# Patient Record
Sex: MALE | Race: WHITE | Employment: OTHER | URBAN - METROPOLITAN AREA
[De-identification: names, ages, dates, MRNs, and addresses within clinical notes are randomized per-mention and may not be internally consistent; named-entity substitution may affect disease eponyms.]

---

## 2020-12-06 ENCOUNTER — APPOINTMENT (OUTPATIENT)
Dept: GENERAL RADIOLOGY | Age: 65
DRG: 177 | End: 2020-12-06
Payer: COMMERCIAL

## 2020-12-06 ENCOUNTER — HOSPITAL ENCOUNTER (INPATIENT)
Age: 65
LOS: 2 days | Discharge: HOME | DRG: 177 | End: 2020-12-08
Attending: EMERGENCY MEDICINE | Admitting: INTERNAL MEDICINE
Payer: COMMERCIAL

## 2020-12-06 PROBLEM — I50.9 ACUTE HEART FAILURE (HCC): Status: ACTIVE | Noted: 2020-12-06

## 2020-12-06 LAB
ALBUMIN SERPL-MCNC: 3.7 G/DL (ref 3.4–5)
ALP BLD-CCNC: 81 U/L (ref 40–129)
ALT SERPL-CCNC: 22 U/L (ref 10–40)
ANION GAP SERPL CALCULATED.3IONS-SCNC: 15 MMOL/L (ref 3–16)
AST SERPL-CCNC: 25 U/L (ref 15–37)
BASE EXCESS VENOUS: 3.8 MMOL/L (ref -2–3)
BASOPHILS ABSOLUTE: 0 K/UL (ref 0–0.2)
BASOPHILS RELATIVE PERCENT: 0.3 %
BILIRUB SERPL-MCNC: 1.1 MG/DL (ref 0–1)
BILIRUBIN DIRECT: 0.6 MG/DL (ref 0–0.3)
BILIRUBIN, INDIRECT: 0.5 MG/DL (ref 0–1)
BUN BLDV-MCNC: 13 MG/DL (ref 7–20)
CALCIUM SERPL-MCNC: 8.8 MG/DL (ref 8.3–10.6)
CARBOXYHEMOGLOBIN: 1.7 % (ref 0–1.5)
CHLORIDE BLD-SCNC: 94 MMOL/L (ref 99–110)
CO2: 25 MMOL/L (ref 21–32)
CREAT SERPL-MCNC: 0.7 MG/DL (ref 0.8–1.3)
EOSINOPHILS ABSOLUTE: 0 K/UL (ref 0–0.6)
EOSINOPHILS RELATIVE PERCENT: 0.9 %
GFR AFRICAN AMERICAN: >60
GFR NON-AFRICAN AMERICAN: >60
GLUCOSE BLD-MCNC: 134 MG/DL (ref 70–99)
GLUCOSE BLD-MCNC: 139 MG/DL (ref 70–99)
HCO3 VENOUS: 27.5 MMOL/L (ref 24–28)
HCT VFR BLD CALC: 43.3 % (ref 40.5–52.5)
HEMOGLOBIN, VEN, REDUCED: 42.2 %
HEMOGLOBIN: 14.4 G/DL (ref 13.5–17.5)
LACTIC ACID: 1.5 MMOL/L (ref 0.4–2)
LYMPHOCYTES ABSOLUTE: 0.3 K/UL (ref 1–5.1)
LYMPHOCYTES RELATIVE PERCENT: 6.6 %
MCH RBC QN AUTO: 28.9 PG (ref 26–34)
MCHC RBC AUTO-ENTMCNC: 33.2 G/DL (ref 31–36)
MCV RBC AUTO: 87.1 FL (ref 80–100)
METHEMOGLOBIN VENOUS: 0.4 % (ref 0–1.5)
MONOCYTES ABSOLUTE: 0.3 K/UL (ref 0–1.3)
MONOCYTES RELATIVE PERCENT: 6.6 %
NEUTROPHILS ABSOLUTE: 3.8 K/UL (ref 1.7–7.7)
NEUTROPHILS RELATIVE PERCENT: 85.6 %
O2 SAT, VEN: 57 %
PCO2, VEN: 39.9 MMHG (ref 41–51)
PDW BLD-RTO: 13.2 % (ref 12.4–15.4)
PERFORMED ON: ABNORMAL
PH VENOUS: 7.45 (ref 7.35–7.45)
PLATELET # BLD: 212 K/UL (ref 135–450)
PMV BLD AUTO: 8.6 FL (ref 5–10.5)
PO2, VEN: 32.8 MMHG (ref 25–40)
POTASSIUM REFLEX MAGNESIUM: 4.1 MMOL/L (ref 3.5–5.1)
PRO-BNP: 1612 PG/ML (ref 0–124)
RBC # BLD: 4.97 M/UL (ref 4.2–5.9)
SODIUM BLD-SCNC: 134 MMOL/L (ref 136–145)
TCO2 CALC VENOUS: 29 MMOL/L
TOTAL PROTEIN: 6.7 G/DL (ref 6.4–8.2)
TROPONIN: <0.01 NG/ML
WBC # BLD: 4.5 K/UL (ref 4–11)

## 2020-12-06 PROCEDURE — 99285 EMERGENCY DEPT VISIT HI MDM: CPT

## 2020-12-06 PROCEDURE — 85384 FIBRINOGEN ACTIVITY: CPT

## 2020-12-06 PROCEDURE — 82803 BLOOD GASES ANY COMBINATION: CPT

## 2020-12-06 PROCEDURE — 6370000000 HC RX 637 (ALT 250 FOR IP)

## 2020-12-06 PROCEDURE — 96361 HYDRATE IV INFUSION ADD-ON: CPT

## 2020-12-06 PROCEDURE — 84484 ASSAY OF TROPONIN QUANT: CPT

## 2020-12-06 PROCEDURE — 85379 FIBRIN DEGRADATION QUANT: CPT

## 2020-12-06 PROCEDURE — 36415 COLL VENOUS BLD VENIPUNCTURE: CPT

## 2020-12-06 PROCEDURE — 6360000002 HC RX W HCPCS

## 2020-12-06 PROCEDURE — 2580000003 HC RX 258

## 2020-12-06 PROCEDURE — 83880 ASSAY OF NATRIURETIC PEPTIDE: CPT

## 2020-12-06 PROCEDURE — 80048 BASIC METABOLIC PNL TOTAL CA: CPT

## 2020-12-06 PROCEDURE — 86140 C-REACTIVE PROTEIN: CPT

## 2020-12-06 PROCEDURE — 71045 X-RAY EXAM CHEST 1 VIEW: CPT

## 2020-12-06 PROCEDURE — 6370000000 HC RX 637 (ALT 250 FOR IP): Performed by: PHYSICIAN ASSISTANT

## 2020-12-06 PROCEDURE — 6360000002 HC RX W HCPCS: Performed by: PHYSICIAN ASSISTANT

## 2020-12-06 PROCEDURE — 85025 COMPLETE CBC W/AUTO DIFF WBC: CPT

## 2020-12-06 PROCEDURE — 82728 ASSAY OF FERRITIN: CPT

## 2020-12-06 PROCEDURE — 80076 HEPATIC FUNCTION PANEL: CPT

## 2020-12-06 PROCEDURE — 2060000000 HC ICU INTERMEDIATE R&B

## 2020-12-06 PROCEDURE — 93005 ELECTROCARDIOGRAM TRACING: CPT | Performed by: PHYSICIAN ASSISTANT

## 2020-12-06 PROCEDURE — 83605 ASSAY OF LACTIC ACID: CPT

## 2020-12-06 PROCEDURE — 2580000003 HC RX 258: Performed by: PHYSICIAN ASSISTANT

## 2020-12-06 PROCEDURE — 96374 THER/PROPH/DIAG INJ IV PUSH: CPT

## 2020-12-06 RX ORDER — VALSARTAN 160 MG/1
160 TABLET ORAL DAILY
Status: ON HOLD | COMMUNITY
End: 2020-12-08 | Stop reason: HOSPADM

## 2020-12-06 RX ORDER — DEXAMETHASONE 4 MG/1
6 TABLET ORAL DAILY
Status: DISCONTINUED | OUTPATIENT
Start: 2020-12-06 | End: 2020-12-08 | Stop reason: HOSPADM

## 2020-12-06 RX ORDER — AMLODIPINE BESYLATE 5 MG/1
5 TABLET ORAL DAILY
COMMUNITY
Start: 2020-09-15

## 2020-12-06 RX ORDER — DEXAMETHASONE 4 MG/1
6 TABLET ORAL DAILY
Status: DISCONTINUED | OUTPATIENT
Start: 2020-12-06 | End: 2020-12-06

## 2020-12-06 RX ORDER — INSULIN LISPRO 100 [IU]/ML
0-6 INJECTION, SOLUTION INTRAVENOUS; SUBCUTANEOUS
Status: DISCONTINUED | OUTPATIENT
Start: 2020-12-07 | End: 2020-12-07

## 2020-12-06 RX ORDER — ACETAMINOPHEN 500 MG
1000 TABLET ORAL ONCE
Status: COMPLETED | OUTPATIENT
Start: 2020-12-06 | End: 2020-12-06

## 2020-12-06 RX ORDER — SODIUM CHLORIDE 0.9 % (FLUSH) 0.9 %
10 SYRINGE (ML) INJECTION PRN
Status: DISCONTINUED | OUTPATIENT
Start: 2020-12-06 | End: 2020-12-08 | Stop reason: HOSPADM

## 2020-12-06 RX ORDER — ASPIRIN 81 MG/1
81 TABLET ORAL DAILY
Status: ON HOLD | COMMUNITY
End: 2020-12-07

## 2020-12-06 RX ORDER — SIMVASTATIN 20 MG
TABLET ORAL
COMMUNITY
Start: 2020-09-15

## 2020-12-06 RX ORDER — HYDROCHLOROTHIAZIDE 25 MG/1
25 TABLET ORAL DAILY
Status: DISCONTINUED | OUTPATIENT
Start: 2020-12-07 | End: 2020-12-07

## 2020-12-06 RX ORDER — POLYETHYLENE GLYCOL 3350 17 G/17G
17 POWDER, FOR SOLUTION ORAL DAILY PRN
Status: DISCONTINUED | OUTPATIENT
Start: 2020-12-06 | End: 2020-12-08 | Stop reason: HOSPADM

## 2020-12-06 RX ORDER — PROMETHAZINE HYDROCHLORIDE 25 MG/1
12.5 TABLET ORAL EVERY 6 HOURS PRN
Status: DISCONTINUED | OUTPATIENT
Start: 2020-12-06 | End: 2020-12-08 | Stop reason: HOSPADM

## 2020-12-06 RX ORDER — CARVEDILOL 12.5 MG/1
12.5 TABLET ORAL 2 TIMES DAILY WITH MEALS
COMMUNITY

## 2020-12-06 RX ORDER — ATORVASTATIN CALCIUM 10 MG/1
10 TABLET, FILM COATED ORAL DAILY
Status: DISCONTINUED | OUTPATIENT
Start: 2020-12-07 | End: 2020-12-08 | Stop reason: HOSPADM

## 2020-12-06 RX ORDER — SODIUM CHLORIDE 0.9 % (FLUSH) 0.9 %
10 SYRINGE (ML) INJECTION EVERY 12 HOURS SCHEDULED
Status: DISCONTINUED | OUTPATIENT
Start: 2020-12-06 | End: 2020-12-08 | Stop reason: HOSPADM

## 2020-12-06 RX ORDER — ONDANSETRON 2 MG/ML
4 INJECTION INTRAMUSCULAR; INTRAVENOUS EVERY 6 HOURS PRN
Status: DISCONTINUED | OUTPATIENT
Start: 2020-12-06 | End: 2020-12-06

## 2020-12-06 RX ORDER — SITAGLIPTIN AND METFORMIN HYDROCHLORIDE 1000; 50 MG/1; MG/1
1 TABLET, FILM COATED ORAL 2 TIMES DAILY
COMMUNITY
Start: 2020-09-15

## 2020-12-06 RX ORDER — AMLODIPINE BESYLATE 5 MG/1
5 TABLET ORAL NIGHTLY
Status: DISCONTINUED | OUTPATIENT
Start: 2020-12-06 | End: 2020-12-07

## 2020-12-06 RX ORDER — DEXTROSE MONOHYDRATE 25 G/50ML
12.5 INJECTION, SOLUTION INTRAVENOUS PRN
Status: DISCONTINUED | OUTPATIENT
Start: 2020-12-06 | End: 2020-12-08 | Stop reason: HOSPADM

## 2020-12-06 RX ORDER — SODIUM CHLORIDE, SODIUM LACTATE, POTASSIUM CHLORIDE, CALCIUM CHLORIDE 600; 310; 30; 20 MG/100ML; MG/100ML; MG/100ML; MG/100ML
1000 INJECTION, SOLUTION INTRAVENOUS ONCE
Status: COMPLETED | OUTPATIENT
Start: 2020-12-06 | End: 2020-12-06

## 2020-12-06 RX ORDER — NICOTINE POLACRILEX 4 MG
15 LOZENGE BUCCAL PRN
Status: DISCONTINUED | OUTPATIENT
Start: 2020-12-06 | End: 2020-12-08 | Stop reason: HOSPADM

## 2020-12-06 RX ORDER — ONDANSETRON 2 MG/ML
4 INJECTION INTRAMUSCULAR; INTRAVENOUS EVERY 6 HOURS PRN
Status: DISCONTINUED | OUTPATIENT
Start: 2020-12-06 | End: 2020-12-08 | Stop reason: HOSPADM

## 2020-12-06 RX ORDER — LISINOPRIL 20 MG/1
20 TABLET ORAL DAILY
Status: DISCONTINUED | OUTPATIENT
Start: 2020-12-07 | End: 2020-12-08 | Stop reason: HOSPADM

## 2020-12-06 RX ORDER — INSULIN LISPRO 100 [IU]/ML
0-3 INJECTION, SOLUTION INTRAVENOUS; SUBCUTANEOUS NIGHTLY
Status: DISCONTINUED | OUTPATIENT
Start: 2020-12-06 | End: 2020-12-07

## 2020-12-06 RX ORDER — DEXTROSE MONOHYDRATE 50 MG/ML
100 INJECTION, SOLUTION INTRAVENOUS PRN
Status: DISCONTINUED | OUTPATIENT
Start: 2020-12-06 | End: 2020-12-08 | Stop reason: HOSPADM

## 2020-12-06 RX ORDER — FUROSEMIDE 10 MG/ML
40 INJECTION INTRAMUSCULAR; INTRAVENOUS 2 TIMES DAILY
Status: DISCONTINUED | OUTPATIENT
Start: 2020-12-06 | End: 2020-12-08 | Stop reason: HOSPADM

## 2020-12-06 RX ORDER — ACETAMINOPHEN 650 MG/1
650 SUPPOSITORY RECTAL EVERY 6 HOURS PRN
Status: DISCONTINUED | OUTPATIENT
Start: 2020-12-06 | End: 2020-12-08 | Stop reason: HOSPADM

## 2020-12-06 RX ORDER — ACETAMINOPHEN 325 MG/1
650 TABLET ORAL EVERY 6 HOURS PRN
Status: DISCONTINUED | OUTPATIENT
Start: 2020-12-06 | End: 2020-12-08 | Stop reason: HOSPADM

## 2020-12-06 RX ORDER — SIMVASTATIN 20 MG
20 TABLET ORAL DAILY
Status: ON HOLD | COMMUNITY
Start: 2020-09-15 | End: 2020-12-07 | Stop reason: SDUPTHER

## 2020-12-06 RX ORDER — BENAZEPRIL/HYDROCHLOROTHIAZIDE 20 MG-25MG
1 TABLET ORAL DAILY
COMMUNITY
Start: 2020-09-15

## 2020-12-06 RX ORDER — SITAGLIPTIN AND METFORMIN HYDROCHLORIDE 1000; 50 MG/1; MG/1
TABLET, FILM COATED ORAL
Status: ON HOLD | COMMUNITY
Start: 2020-09-11 | End: 2020-12-07 | Stop reason: SDUPTHER

## 2020-12-06 RX ORDER — CARVEDILOL 12.5 MG/1
12.5 TABLET ORAL 2 TIMES DAILY WITH MEALS
Status: DISCONTINUED | OUTPATIENT
Start: 2020-12-07 | End: 2020-12-07

## 2020-12-06 RX ADMIN — ONDANSETRON 4 MG: 2 INJECTION INTRAMUSCULAR; INTRAVENOUS at 19:04

## 2020-12-06 RX ADMIN — AMLODIPINE BESYLATE 5 MG: 5 TABLET ORAL at 23:50

## 2020-12-06 RX ADMIN — DEXAMETHASONE 6 MG: 4 TABLET ORAL at 23:50

## 2020-12-06 RX ADMIN — FUROSEMIDE 40 MG: 10 INJECTION, SOLUTION INTRAMUSCULAR; INTRAVENOUS at 23:50

## 2020-12-06 RX ADMIN — ACETAMINOPHEN 1000 MG: 500 TABLET, COATED ORAL at 18:16

## 2020-12-06 RX ADMIN — SODIUM CHLORIDE, SODIUM LACTATE, POTASSIUM CHLORIDE, AND CALCIUM CHLORIDE 1000 ML: 600; 310; 30; 20 INJECTION, SOLUTION INTRAVENOUS at 18:17

## 2020-12-06 RX ADMIN — Medication 10 ML: at 23:51

## 2020-12-06 ASSESSMENT — ENCOUNTER SYMPTOMS
ABDOMINAL PAIN: 0
APNEA: 0
VOMITING: 0
CONSTIPATION: 0
SORE THROAT: 0
SHORTNESS OF BREATH: 1
CHEST TIGHTNESS: 0
NAUSEA: 0
COUGH: 1
DIARRHEA: 0

## 2020-12-06 ASSESSMENT — PAIN SCALES - GENERAL
PAINLEVEL_OUTOF10: 0

## 2020-12-06 NOTE — ED NOTES
Bed: B17-17  Expected date:   Expected time:   Means of arrival:   Comments:  Mariah Branch6, RN  12/06/20 [id number]

## 2020-12-07 LAB
ANION GAP SERPL CALCULATED.3IONS-SCNC: 12 MMOL/L (ref 3–16)
BASOPHILS ABSOLUTE: 0 K/UL (ref 0–0.2)
BASOPHILS RELATIVE PERCENT: 0.2 %
BUN BLDV-MCNC: 10 MG/DL (ref 7–20)
C-REACTIVE PROTEIN: 176.6 MG/L (ref 0–5.1)
CALCIUM SERPL-MCNC: 9.1 MG/DL (ref 8.3–10.6)
CHLORIDE BLD-SCNC: 94 MMOL/L (ref 99–110)
CO2: 29 MMOL/L (ref 21–32)
CREAT SERPL-MCNC: 0.8 MG/DL (ref 0.8–1.3)
D DIMER: 301 NG/ML DDU (ref 0–229)
EKG ATRIAL RATE: 107 BPM
EKG DIAGNOSIS: NORMAL
EKG Q-T INTERVAL: 372 MS
EKG QRS DURATION: 92 MS
EKG QTC CALCULATION (BAZETT): 496 MS
EKG R AXIS: 18 DEGREES
EKG T AXIS: -9 DEGREES
EKG VENTRICULAR RATE: 107 BPM
EOSINOPHILS ABSOLUTE: 0 K/UL (ref 0–0.6)
EOSINOPHILS RELATIVE PERCENT: 0.1 %
FERRITIN: 454.8 NG/ML (ref 30–400)
FIBRINOGEN: 783 MG/DL (ref 200–397)
GFR AFRICAN AMERICAN: >60
GFR NON-AFRICAN AMERICAN: >60
GLUCOSE BLD-MCNC: 201 MG/DL (ref 70–99)
GLUCOSE BLD-MCNC: 205 MG/DL (ref 70–99)
GLUCOSE BLD-MCNC: 340 MG/DL (ref 70–99)
GLUCOSE BLD-MCNC: 349 MG/DL (ref 70–99)
GLUCOSE BLD-MCNC: 350 MG/DL (ref 70–99)
HCT VFR BLD CALC: 42.8 % (ref 40.5–52.5)
HEMOGLOBIN: 14.3 G/DL (ref 13.5–17.5)
LYMPHOCYTES ABSOLUTE: 0.3 K/UL (ref 1–5.1)
LYMPHOCYTES RELATIVE PERCENT: 6.3 %
MCH RBC QN AUTO: 28.8 PG (ref 26–34)
MCHC RBC AUTO-ENTMCNC: 33.4 G/DL (ref 31–36)
MCV RBC AUTO: 86.2 FL (ref 80–100)
MONOCYTES ABSOLUTE: 0.3 K/UL (ref 0–1.3)
MONOCYTES RELATIVE PERCENT: 5.4 %
NEUTROPHILS ABSOLUTE: 4.5 K/UL (ref 1.7–7.7)
NEUTROPHILS RELATIVE PERCENT: 88 %
PDW BLD-RTO: 13.1 % (ref 12.4–15.4)
PERFORMED ON: ABNORMAL
PLATELET # BLD: 219 K/UL (ref 135–450)
PMV BLD AUTO: 8.2 FL (ref 5–10.5)
POTASSIUM REFLEX MAGNESIUM: 4.4 MMOL/L (ref 3.5–5.1)
RBC # BLD: 4.97 M/UL (ref 4.2–5.9)
SODIUM BLD-SCNC: 135 MMOL/L (ref 136–145)
WBC # BLD: 5.2 K/UL (ref 4–11)

## 2020-12-07 PROCEDURE — 6370000000 HC RX 637 (ALT 250 FOR IP)

## 2020-12-07 PROCEDURE — 6370000000 HC RX 637 (ALT 250 FOR IP): Performed by: INTERNAL MEDICINE

## 2020-12-07 PROCEDURE — 2580000003 HC RX 258

## 2020-12-07 PROCEDURE — 36415 COLL VENOUS BLD VENIPUNCTURE: CPT

## 2020-12-07 PROCEDURE — 80048 BASIC METABOLIC PNL TOTAL CA: CPT

## 2020-12-07 PROCEDURE — 6360000002 HC RX W HCPCS: Performed by: STUDENT IN AN ORGANIZED HEALTH CARE EDUCATION/TRAINING PROGRAM

## 2020-12-07 PROCEDURE — 85025 COMPLETE CBC W/AUTO DIFF WBC: CPT

## 2020-12-07 PROCEDURE — U0003 INFECTIOUS AGENT DETECTION BY NUCLEIC ACID (DNA OR RNA); SEVERE ACUTE RESPIRATORY SYNDROME CORONAVIRUS 2 (SARS-COV-2) (CORONAVIRUS DISEASE [COVID-19]), AMPLIFIED PROBE TECHNIQUE, MAKING USE OF HIGH THROUGHPUT TECHNOLOGIES AS DESCRIBED BY CMS-2020-01-R: HCPCS

## 2020-12-07 PROCEDURE — 6370000000 HC RX 637 (ALT 250 FOR IP): Performed by: STUDENT IN AN ORGANIZED HEALTH CARE EDUCATION/TRAINING PROGRAM

## 2020-12-07 PROCEDURE — 2060000000 HC ICU INTERMEDIATE R&B

## 2020-12-07 PROCEDURE — 6360000002 HC RX W HCPCS

## 2020-12-07 RX ORDER — INSULIN LISPRO 100 [IU]/ML
0-6 INJECTION, SOLUTION INTRAVENOUS; SUBCUTANEOUS NIGHTLY
Status: DISCONTINUED | OUTPATIENT
Start: 2020-12-07 | End: 2020-12-08 | Stop reason: HOSPADM

## 2020-12-07 RX ORDER — INSULIN LISPRO 100 [IU]/ML
8 INJECTION, SOLUTION INTRAVENOUS; SUBCUTANEOUS ONCE
Status: COMPLETED | OUTPATIENT
Start: 2020-12-07 | End: 2020-12-08

## 2020-12-07 RX ORDER — INSULIN LISPRO 100 [IU]/ML
0-12 INJECTION, SOLUTION INTRAVENOUS; SUBCUTANEOUS
Status: DISCONTINUED | OUTPATIENT
Start: 2020-12-07 | End: 2020-12-08 | Stop reason: HOSPADM

## 2020-12-07 RX ORDER — CARVEDILOL 3.12 MG/1
3.12 TABLET ORAL 2 TIMES DAILY WITH MEALS
Status: DISCONTINUED | OUTPATIENT
Start: 2020-12-07 | End: 2020-12-08 | Stop reason: HOSPADM

## 2020-12-07 RX ADMIN — ATORVASTATIN CALCIUM 10 MG: 10 TABLET, FILM COATED ORAL at 09:44

## 2020-12-07 RX ADMIN — FUROSEMIDE 40 MG: 10 INJECTION, SOLUTION INTRAMUSCULAR; INTRAVENOUS at 18:40

## 2020-12-07 RX ADMIN — ACETAMINOPHEN 650 MG: 325 TABLET ORAL at 02:15

## 2020-12-07 RX ADMIN — CARVEDILOL 12.5 MG: 12.5 TABLET, FILM COATED ORAL at 12:10

## 2020-12-07 RX ADMIN — FUROSEMIDE 40 MG: 10 INJECTION, SOLUTION INTRAMUSCULAR; INTRAVENOUS at 09:44

## 2020-12-07 RX ADMIN — CARVEDILOL 3.12 MG: 3.12 TABLET, FILM COATED ORAL at 18:40

## 2020-12-07 RX ADMIN — ENOXAPARIN SODIUM 40 MG: 40 INJECTION SUBCUTANEOUS at 20:54

## 2020-12-07 RX ADMIN — Medication 10 ML: at 20:59

## 2020-12-07 RX ADMIN — LISINOPRIL 20 MG: 20 TABLET ORAL at 09:44

## 2020-12-07 RX ADMIN — DEXAMETHASONE 6 MG: 4 TABLET ORAL at 09:44

## 2020-12-07 RX ADMIN — INSULIN LISPRO 2 UNITS: 100 INJECTION, SOLUTION INTRAVENOUS; SUBCUTANEOUS at 09:39

## 2020-12-07 RX ADMIN — INSULIN GLARGINE 10 UNITS: 100 INJECTION, SOLUTION SUBCUTANEOUS at 20:53

## 2020-12-07 RX ADMIN — HYDROCHLOROTHIAZIDE 25 MG: 25 TABLET ORAL at 09:44

## 2020-12-07 RX ADMIN — INSULIN LISPRO 5 UNITS: 100 INJECTION, SOLUTION INTRAVENOUS; SUBCUTANEOUS at 20:53

## 2020-12-07 RX ADMIN — INSULIN LISPRO 4 UNITS: 100 INJECTION, SOLUTION INTRAVENOUS; SUBCUTANEOUS at 13:18

## 2020-12-07 RX ADMIN — INSULIN LISPRO 8 UNITS: 100 INJECTION, SOLUTION INTRAVENOUS; SUBCUTANEOUS at 18:27

## 2020-12-07 RX ADMIN — ENOXAPARIN SODIUM 40 MG: 40 INJECTION SUBCUTANEOUS at 09:44

## 2020-12-07 RX ADMIN — Medication 10 ML: at 09:44

## 2020-12-07 NOTE — ED NOTES
Spoke to patient's daughter, Mayito Medellin, with patient's permission. Updated with plan of care. Mayito Medellin will be point- for updates.       Chantel Carlson RN  12/06/20 6916

## 2020-12-07 NOTE — ED PROVIDER NOTES
1 [facility]  EMERGENCY DEPARTMENT ENCOUNTER          PHYSICIAN ASSISTANT NOTE       *[facility] has declared a state of emergency regarding the [address] pandemic*    Date of evaluation: 12/6/2020    Chief Complaint     Shortness of Breath (COVID+)      History of Present Illness     Daysi Maharaj is a 72 y.o. male with a history of HTN presents today as a known COVID19 positive patient with several days of worsening shortness of breath, especially with lying flat. Patient states that last night he awoke from his sleep with the sense that he had stopped breathing and couldn't catch his breath. He felt progressively fatigued today which prompted him to seek treatment. He denies CP, fever, chills, sweats, history of DVT/PE but does endorse nausea without vomiting. His wife has been giving him some herbal remedies at home with no improvement. 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 Review of Systems     As documented in the HPI, otherwise all other systems were reviewed and were negative. Past Medical, Surgical, Family, and Social History     He has a past medical history of Hypertension. He has no past surgical history on file. His family history is not on file. He reports that he has never smoked. He has never used smokeless tobacco. He reports previous alcohol use. He reports previous drug use. Medications     Previous Medications    No medications on file       Allergies     He has No Known Allergies. Physical Exam     INITIAL VITALS: BP: (!) 136/102, Temp: 99.3 °F (37.4 °C), Pulse: 115, Resp: (!) 34, SpO2: 100 %     General: Well appearing, well nourished, in no apparent state of distress. HEENT:  Normocephalic, atraumatic. Pupils equal, sclera white. Handling secretions without difficulty. Neck: No meningismus. Trachea midline    Pulmonary: Respirations even. Non labored. Tachypnic.  Good air movement throughout    Cardiac: Chest symmetrical and non-tender on palpation of chest wall. RRR. no M/R/G. Abdomen:  Non-distended. Bowel sounds present in all quadrants. Non rigid and non tender to palpation. Musculoskeletal:  Ambulates under own control. No peripheral edema. Neuro:  Alert and oriented x 3. CN II - XII grossly intact. Moves all extremities spontaneously. Vascular:  2+ peripheral pulses in bilateral upper and lower extremities      Skin:  Warm and well perfused without rashes or lesions    Psych:  Appropriate mood and affect        Diagnostic Results     EKG   Interpreted in conjunction with emergency department physician Frank Fowler MD  Indication: shortness of breath  Impression: , undetermined rhytm due to severe artifact but appears    RADIOLOGY:  XR CHEST PORTABLE   Final Result      1. Patchy nonsegmental diffuse bilateral nodular hazy airspace disease most like representing Covid Pneumonia   2.  Mild cardiac enlargement                   LABS:   Results for orders placed or performed during the hospital encounter of 12/06/20   CBC Auto Differential   Result Value Ref Range    WBC 4.5 4.0 - 11.0 K/uL    RBC 4.97 4.20 - 5.90 M/uL    Hemoglobin 14.4 13.5 - 17.5 g/dL    Hematocrit 43.3 40.5 - 52.5 %    MCV 87.1 80.0 - 100.0 fL    MCH 28.9 26.0 - 34.0 pg    MCHC 33.2 31.0 - 36.0 g/dL    RDW 13.2 12.4 - 15.4 %    Platelets 187 613 - 138 K/uL    MPV 8.6 5.0 - 10.5 fL    Neutrophils % 85.6 %    Lymphocytes % 6.6 %    Monocytes % 6.6 %    Eosinophils % 0.9 %    Basophils % 0.3 %    Neutrophils Absolute 3.8 1.7 - 7.7 K/uL    Lymphocytes Absolute 0.3 (L) 1.0 - 5.1 K/uL    Monocytes Absolute 0.3 0.0 - 1.3 K/uL    Eosinophils Absolute 0.0 0.0 - 0.6 K/uL    Basophils Absolute 0.0 0.0 - 0.2 K/uL   Basic Metabolic Panel w/ Reflex to MG   Result Value Ref Range    Sodium 134 (L) 136 - 145 mmol/L    Potassium reflex Magnesium 4.1 3.5 - 5.1 mmol/L    Chloride 94 (L) 99 - 110 mmol/L    CO2 25 21 - 32 mmol/L    Anion Gap 15 3 - 16    Glucose 139 (H) 70 - 99 mg/dL    BUN 13 7 - 20 mg/dL    CREATININE 0.7 (L) 0.8 - 1.3 mg/dL    GFR Non-African American >60 >60    GFR African American >60 >60    Calcium 8.8 8.3 - 10.6 mg/dL   Hepatic Function Panel   Result Value Ref Range    Total Protein 6.7 6.4 - 8.2 g/dL    Alb 3.7 3.4 - 5.0 g/dL    Alkaline Phosphatase 81 40 - 129 U/L    ALT 22 10 - 40 U/L    AST 25 15 - 37 U/L    Total Bilirubin 1.1 (H) 0.0 - 1.0 mg/dL    Bilirubin, Direct 0.6 (H) 0.0 - 0.3 mg/dL    Bilirubin, Indirect 0.5 0.0 - 1.0 mg/dL   Lactic Acid, Plasma   Result Value Ref Range    Lactic Acid 1.5 0.4 - 2.0 mmol/L   Blood Gas, Venous   Result Value Ref Range    pH, George 7.454 (H) 7.350 - 7.450    pCO2, George 39.9 (L) 41.0 - 51.0 mmHg    pO2, George 32.8 25.0 - 40.0 mmHg    HCO3, Venous 27.5 24.0 - 28.0 mmol/L    Base Excess, George 3.8 (H) -2.0 - 3.0 mmol/L    O2 Sat, George 57 Not established %    Carboxyhemoglobin 1.7 (H) 0.0 - 1.5 %    MetHgb, George 0.4 0.0 - 1.5 %    TC02 (Calc), George 29 mmol/L    Hemoglobin, George, Reduced 42.20 %   Brain Natriuretic Peptide   Result Value Ref Range    Pro-BNP 1,612 (H) 0 - 124 pg/mL   Troponin   Result Value Ref Range    Troponin <0.01 <0.01 ng/mL       ED BEDSIDE ULTRASOUND:      RECENT VITALS:  BP: (!) 144/95, Temp: 99.3 °F (37.4 °C), Pulse: 107, Resp: 22, SpO2: 93 %     Procedures         ED Course     Nursing Notes, Past Medical Hx, Past Surgical Hx, Social Hx, Allergies, and Family Hx were reviewed. The patient was given the following medications:  Orders Placed This Encounter   Medications    lactated ringers infusion 1,000 mL    acetaminophen (TYLENOL) tablet 1,000 mg    ondansetron (ZOFRAN) injection 4 mg       CONSULTS:  IP CONSULT TO HOSPITALIST    MEDICAL DECISION MAKING / ASSESSMENT / Jarrett Rajesh is a 72 y.o. male with a history of hypertension presents today as a known COVID-19 positive patient with several days of worsening orthopnea.   On my examination he is requiring 2-3 L for comfort but appears otherwise stable with a blood pressure 144/95. With supplemental oxygen he saturates 97% on room air. Subtle temperature elevation of 99.3. He has not been eating well lately and appears slightly dry on examination. We will start some gentle IV fluid rehydration and perform laboratory work and a chest x-ray. Chest x-ray most consistent with viral pneumonia multifocal pattern consistent with COVID-19. EKG with significant baseline artifact but does not show obvious right-sided heart strain and he has no DVT/PE risk factors to raise concern for acute pulmonary embolism at this time. Troponin negative. proBNP is 1600 which could represent a new degree of heart failure or heart strain secondary to his Covid infection. Chest x-ray is not consistent entirely pulmonary edema and he has no JVD or peripheral edema to raise concern for new onset heart failure. We will continue to evaluate this as an inpatient. Discussed case the hospitalist team and will plan for admission. This patient was also evaluated by the attending physician. All care plans were discussed and agreed upon. Clinical Impression     1. Pneumonia due to COVID-19 virus        Disposition     PATIENT REFERRED TO:  No follow-up provider specified.     DISCHARGE MEDICATIONS:  New Prescriptions    No medications on file       DISPOSITION Decision To Admit 12/06/2020 07:45:47 PM        Torri Hare PA-C  12/06/20 2012

## 2020-12-07 NOTE — PROGRESS NOTES
NUTRITION NOTE   Admission Date: 12/6/2020     Type and Reason for Visit: Positive Nutrition Screen    NUTRITION RECOMMENDATIONS:   1. PO Diet: Continue current low sodium, CC 4/meal diet. 2. ONS: Adding Glucerna BID to have when intake of meals is inadequate. NUTRITION ASSESSMENT:  RD assessment triggered for malnutrition screening tool score indicting weight loss and decreased appetite. Pt with PMHx of HTN, T2DM, HLD, and COVID + who is admitted with worsening SOB. Per phone interview, pt has decreased appetite for about a week prior to admission and lost ~5 lbs. Pt agreeing to have ONS BID to aid with meeting nutritional for weight maintenance during this admission and RD will continue to monitor per [facility]. MALNUTRITION ASSESSMENT  Context of Malnutrition: Acute Illness   Malnutrition Status: At risk for malnutrition (Comment)    NUTRITION DIAGNOSIS   Problem: Problem #1: Unintended weight loss  Etiology: Decreased ability to consume sufficient energy   Signs & Symptoms: Patient report of  and Weight loss     NUTRITION INTERVENTION  Food and/or Nutrient Delivery:Continue Current diet  or Start ONS   Nutrition education/counseling/coordination of care: Continue Inpatient Monitoring     NUTRITION RISK LEVEL: Risk Level: Low        The patient will still be monitored per nutrition standards of care. Consult dietitian if nutrition interventions essential to patient care is needed.      Esther Thomas, [address] [place] [address],   Josue:  [phone]  Office:  [phone]

## 2020-12-07 NOTE — PLAN OF CARE
Problem: Falls - Risk of:  Goal: Will remain free from falls  Description: Will remain free from falls  Outcome: Ongoing     Problem: Gas Exchange - Impaired:  Goal: Ability to maintain adequate ventilation will improve  Description: Ability to maintain adequate ventilation will improve  Outcome: Ongoing

## 2020-12-07 NOTE — PROGRESS NOTES
Internal Medicine  Progress Note    Admission Date: 12/6/2020     Chief Complaint: SOB    History of Present Illness:  Mr. Khang Felipe is a 72-year-old male with HTN, T2DM, HLD, and COVID + test 12/1 who presented with several days of worsening shortness of breath. Patient feels particular short of breath while laying flat and endorses dyspnea on exertion. Patient states that he has been on and off diuretics for years. Dating back to his days in the [employer]. He previously had an echo done while in the [employer] and he is unsure of the results. He does not have a documented diagnosis of CHF at this time. For the patient's [id number] diagnosis, he  originally tested positive on 12/1/2020. 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 Endorsed a nonproductive cough. At presentation he denied chest pain, fever, chills, sweats, but did endorse nausea with no vomiting. Interval History:  Febrile to 100.6 overnight. O2 requirement stable at 3 lpm. Continues decadron and lasix. He reports that over the previous week he has experienced headache, loss of taste of smell, sore throat, cough, SOB, nausea, and diarrhea    Patient was off oxygen on my exam - states he removed the nasal canula when he went to restroom and did not feel he needed it. Patient encouraged to use it until told otherwise by nursing staff. Patient states he feels much improved today. Past Medical History:      Diagnosis Date    Hypertension          Past Surgical History:  History reviewed. No pertinent surgical history.       Medications Prior to Admission:  Medications Prior to Admission: aspirin 81 MG EC tablet, Take 81 mg by mouth daily  valsartan (DIOVAN) 160 MG tablet, Take 160 mg by mouth daily  JANUMET  MG per tablet, TK 1 T PO BID  sitaGLIPtan-metFORMIN (JANUMET)  MG per tablet, Take 1 tablet by mouth 2 times daily  simvastatin (ZOCOR) 20 MG tablet, TK 1 T PO  D  simvastatin (ZOCOR) last 72 hours. ABG: No results for input(s): PHART, UGP8WBT, PO2ART, QJW4PGR in the last 72 hours. U/A:No results for input(s): Toshia Lal, GREGORYU, Francis Parks in the last 72 hours. Microbiology Cultures:   No results for input(s): BC in the last 72 hours. No results for input(s): Aundria Harkins in the last 72 hours. No results for input(s): LABURIN in the last 72 hours. EC2020: Significant baseline artifact. Tachycardic at 107, rate appears regular. Imaging:  XR CHEST PORTABLE   Final Result      1. Patchy nonsegmental diffuse bilateral nodular hazy airspace disease most like representing Covid Pneumonia   2. Mild cardiac enlargement                     Assessment and Plan:  Rosalva Paul is a 72 y.o. male with a PMH of HTN, T2DM, HLD, and COVID who p/w progressive SOB.      Acute hypoxic respiratory failure likely 2/2 acute exacerbation of undiagnosed CHF and COVID PNA  - Supplemental O2, wean to > 89%     Probable Acute exacerbation of CHF, unknown EF and diastolic funciton  SOB, orthopnea, [place]. Crackles on presentation. AdmissionPro-BNP [id number]. Mild cardiac enlargement on admission CXR. Reportedly has been prescribed diuretics but not formally diagnosed with CHF.  - Continue IV Lasix 40 BID  - Continue home BB, ACE-I (lisinopril in substitution for home benzapril)  - Holding home ARB. Patient confirmed he takes both ACE-I and ARB at home  - Strict I's and O's, daily standing weights, low-salt diet  - Echo outpatient    COVID-19 PNA  States he tested positive on 2020. Elevated admission labs: Fibrinogen 783, D-Dimer 301, ferritin 454.8, .6. Admission CXR with patchy nonsegmental diffuse b/l nodular hazy airspace disease most likely representing COVID PNA.   - Continue dexmethasone  - Change lovenox to BID   - q2d Ferritin, Fibrinogen, D-dimer, CRP  - Patient initially refused remdesivir 2/2 desire to not remain hospitalized during full treatment period     HTN  - Continue home amlodipine, HCTZ, BB, ACE-I (lisinopril in place of home benzapril)  - Holding home ARB. HLD  - Continue atorvastatin in substitution for home simvastatin     T2DM  A1C 11.4 in April 2019. Uses sitagliptan-metformin at home but states he uses it just once daily, not BID as prescribed. - LDSSI  - Carb control diet    Will discuss with attending physician Dr. Anna Burgos. Code Status: Full Code  FEN: DIET LOW SODIUM 2 GM; Carb Control: 4 carb choices (60 gms)/meal  PPX: Lovenox  DISPO: GERSON Zhou MD PGY-1  12/7/2020, 8:12 AM     Patient seen and examined, labs and imaging studies reviewed, agree with assessment and plan as outlined above. Continue with current care and plan as outlined above. Patient admitted with covid 23, was recently tested positive, hold all diuretics for right now and any unnecessary medications; patient is a , he is feeling slightly better had been ill for about a week. Greater than 35 minutes spent on case over halff ace to face.      Rick Mistry MD [address]

## 2020-12-07 NOTE — ED PROVIDER NOTES
ED Attending Attestation Note     Date of evaluation: 12/6/2020    This patient was seen by the advance practice provider. I have seen and examined the patient, agree with the workup, evaluation, management and diagnosis. The care plan has been discussed. I have reviewed the ECG and concur with the [place]'s interpretation. Patient works at a [employer] where there is a Covid outbreak. He is known Covid positive. He is noted to have tachypnea and was hypoxic on room air. He is on supplemental oxygen. He will likely require admission given his hypoxia. Yolanda Siddiqi MD  12/06/20 7522

## 2020-12-07 NOTE — CONSULTS
Clinical Pharmacy Progress Note  Medication History     Admit Date: 12/6/2020    Pharmacy asked to verify home medications per Dr. Malena Estrada. List of of current medications patient is taking is complete. Home Medication list in EPIC updated to reflect changes noted below. Source of information: telephone interview with patient, Rx fill history (Surescripts)    Patient's home pharmacy: [facility] ([phone])     Changes made to medication list:   Medications removed: (include reason, ex: therapy completed, inactive medication)   Aspirin - pt states he no longer takes   Duplicate entry for simvastatin   Duplicate entry for Janumet  Medication doses adjusted:    Carvedilol - dose is 12.5 mg BID  Other notes:    Of note, pt does state he takes both Valsartan and Benazepril-HCTZ      Please call with any questions.   Han Hill, PharmD, [facility]  Wireless # [phone]  12/7/2020 10:25 AM

## 2020-12-07 NOTE — PROGRESS NOTES
Pt arrives from the ER. Pt is on 3L oxygen. Pt VSS. Pt is alert and oriented x4 and independently ambulatory.

## 2020-12-07 NOTE — H&P
History & Physical  PGY 2  Internal Medicine    Chief Complaint: Shortness of breath    History Obtained From:  patient    HISTORY OF PRESENT ILLNESS:   Mr. Ajnali Hernandez Is a 77-year-old male with a history of hypertension, type 2 diabetes, hyperlipidemia and known Covid positive who presents today with several days of worsening shortness of breath. Patient feels particular short of breath while laying flat and endorses dyspnea on exertion. Patient states that he has been on and off diuretics for years. Dating back to his days in the [employer]. He previously had an echo done while in the [employer] and he is unsure of the results. He does not have a documented diagnosis of CHF at this time. For the patient's JUZWH-07 diagnosis, he  originally tested positive on 12/1/2020. 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 Patient states that he at the time of diagnosis had a cough, shortness of breath and high fever, but has been afebrile for the past few days. Does continue to endorse a nonproductive cough. At this time he denies chest pain, fever, chills, sweats, but does endorse nausea with no vomiting. In the ED: On presentation patient saturating 87% on room air. Placed on 2 L of oxygen and saturating in the low 90s. Temp equal 99.3, heart rate 115, blood pressure 136/102. Troponin less than 0.01, and proBNP elevated to 1612. Patient's x-ray consistent with COVID-19 pneumonia as well as a pulmonary edema from being fluid overloaded. Patient does have crackles on the bases of his lungs on exam.      PAST HISTORY:     Past Medical History:        Diagnosis Date    Hypertension    ·     Past Surgical History:    · History reviewed. No pertinent surgical history. Medications Priorto Admission:    · Not in a hospital admission. Allergies:  Patient has no known allergies. Social History:   · TOBACCO:   reports that he has never smoked.  He has never used smokeless tobacco.  · ETOH:   reports previous alcohol use. · DRUGS : none  · Patient currently lives at home with family    Family History:   · History reviewed. No pertinent family history. Review of Systems   Constitutional: Positive for fever. Negative for activity change, appetite change, chills, diaphoresis and unexpected weight change. HENT: Negative for congestion and sore throat. Eyes: Negative for visual disturbance. Respiratory: Positive for cough and shortness of breath. Negative for apnea and chest tightness. Cardiovascular: Negative for chest pain, palpitations and leg swelling. Gastrointestinal: Negative for abdominal pain, constipation, diarrhea, nausea and vomiting. Endocrine: Negative for cold intolerance, heat intolerance, polydipsia, polyphagia and polyuria. Genitourinary: Negative for difficulty urinating. Musculoskeletal: Negative for arthralgias and myalgias. Neurological: Negative for weakness and headaches. Psychiatric/Behavioral: Negative for confusion and sleep disturbance. All other systems reviewed and are negative. ROS: A 10 point review of systems was conducted, significant findings as noted in HPI. Physical Exam:     Vitals:    12/06/20 1900   BP: (!) 144/95   Pulse: 107   Resp:    Temp:    SpO2: 93%     Physical Exam  Vitals signs reviewed. Constitutional:       General: He is not in acute distress. Appearance: He is well-developed and well-groomed. HENT:      Head: Normocephalic and atraumatic. Mouth/Throat:      Mouth: Mucous membranes are moist.      Pharynx: Oropharynx is clear. Eyes:      General: No scleral icterus. Extraocular Movements: Extraocular movements intact. Conjunctiva/sclera: Conjunctivae normal.      Pupils: Pupils are equal, round, and reactive to light. Neck:      Musculoskeletal: Full passive range of motion without pain, normal range of motion and neck supple.    Cardiovascular:      Rate and Rhythm: Normal rate and regular rhythm. Pulses: Normal pulses. Heart sounds: Normal heart sounds, S1 normal and S2 normal. No murmur. Pulmonary:      Effort: Pulmonary effort is normal. No respiratory distress. Breath sounds: Normal air entry. Rales (at bases ) present. Abdominal:      General: Abdomen is flat. Bowel sounds are normal.      Palpations: Abdomen is soft. Tenderness: There is no abdominal tenderness. Musculoskeletal: Normal range of motion. Skin:     General: Skin is warm and dry. Neurological:      General: No focal deficit present. Mental Status: He is alert and oriented to person, place, and time. Cranial Nerves: Cranial nerves are intact. Sensory: Sensation is intact. Motor: Motor function is intact. Coordination: Coordination is intact. Gait: Gait is intact. Deep Tendon Reflexes: Reflexes are normal and symmetric. Psychiatric:         Attention and Perception: Attention and perception normal.         Mood and Affect: Mood normal.         Speech: Speech normal.         Behavior: Behavior normal. Behavior is cooperative. Thought Content: Thought content normal.         Cognition and Memory: Cognition and memory normal.         Judgment: Judgment normal.         Data:   Labs:  CBC:   Recent Labs     12/06/20  1801   WBC 4.5   HGB 14.4   HCT 43.3          BMP:   Recent Labs     12/06/20  1801   *   K 4.1   CL 94*   CO2 25   BUN 13   CREATININE 0.7*   GLUCOSE 139*     LFT's:   Recent Labs     12/06/20  1801   AST 25   ALT 22   BILITOT 1.1*   ALKPHOS 81     Troponin:   Recent Labs     12/06/20  1801   TROPONINI <0.01     BNP:No results for input(s): BNP in the last 72 hours. ABGs: No results for input(s): PHART, UMP4VKZ, PO2ART in the last 72 hours. INR: No results for input(s): INR in the last 72 hours.     U/A:No results for input(s): NITRITE, COLORU, PHUR, LABCAST, WBCUA, RBCUA, MUCUS, TRICHOMONAS, YEAST, BACTERIA, CLARITYU, SPECGRAV, LEUKOCYTESUR, UROBILINOGEN, BILIRUBINUR, BLOODU, GLUCOSEU, AMORPHOUS in the last 72 hours. Invalid input(s): KETONESU    XR CHEST PORTABLE   Final Result      1. Patchy nonsegmental diffuse bilateral nodular hazy airspace disease most like representing Covid Pneumonia   2. Mild cardiac enlargement                     ASSESSMENT AND PLAN:   Mr. Ben Fried Is a 70-year-old male with a history of hypertension, type 2 diabetes, hyperlipidemia and known Covid positive who presents today with several days of worsening shortness of breath. Acute hypoxic respiratory failure likely 2/2 acute exacerbation of undiagnosed CHF  Patient found to be hypoxic on presentation satting in the 80s. Placed on 2 L nasal cannula and now saturating in the 90s  · Continue supplemental oxygen as needed  · Wean for sat greater than 89%  · Treatment for positive conditions as below    Acute exacerbation of CHF, unknown EF and diastolic funciton. Patient presents with shortness of breath, orthopnea, dyspnea on exertion. Chest x-ray consistent with pulmonary edema, pro BNP elevated to 1612, and has crackles on exam.  He has not had an echo since his days in the [facility] and he does not currently have a diagnosis of CHF. Patient states that he has been taking diuretics on and off for years. · IV Lasix 40 mg twice daily  · Strict I's and O's  · Low-salt diet  · Daily weights  · Echocardiogram  · Continue home carvedilol 12.5 twice daily  · Continue home ACEI    COVID-19 pneumonia  Patient tested positive for COVID-19 on 12/1/2020. Increased shortness of breath at this time likely cardiac in etiology. Treatments available for COVID-19 were discussed with the patient at this time. He is interested in starting dexamethasone as well as as seeing what his response to Lasix will be for his pulmonary edema.   Rest benefits of remdesivir were discussed including the need to be hospitalized for 5 days while receiving the medication. Patient states understanding and would like to see his response to other treatments first as he has been COVID-19 positive for almost a week. · Dexamethasone 6 mg daily for 10 days  · Follow-up D-dimer, ferritin, fibrinogen, and CRP    Hypertension  · Amlodipine 5 mg nightly  · Lisinopril 20 mg daily   · HCTZ 25 mg daily  · Carvedilol 12.5 mg twice daily    Hyperlipidemia  · Lipitor 10 mg daily in substitution for his home simvastatin    Type 2 diabetes mellitus, not insulin-dependent, well controlled  · Hold home oral medications  · POCT glucose checks  · Hypoglycemia protocol  · Monitor patient blood sugar while he is taking high-dose steroids        Code Status: Full  FEN: Low-salt diet and carb control  PPX: Lovenox  DISPO: General Medical Floor    This patient has been staffed and discussed with Dr. Noel Rodriguez  -----------------------------  Adrienne Grossman MD, PGY-2  12/6/2020  8:08 PM    I saw and evaluated the patient independently from the resident . I discussed the care with the resident. I personally reviewed the HPI, PH, FH, SH, ROS and medications. I repeated pertinent portions of the examination and reviewed the relevant imaging and laboratory data. I agree with the findings, assessment and plan as documented. addition to: Patient 70-year-old male morbid obese admitted for acute hypoxic respiratory failure secondary to suspected CHF exacerbation, echocardiogram pending in the setting of COVID-19 pneumonia.

## 2020-12-07 NOTE — CONSULTS
Clinical Pharmacy Progress Note  Medication History     Admit Date: 12/6/2020    Pharmacy asked to verify home medications per Dr. Rodolfo Garcia. List of of current medications patient is taking is complete. Home Medication list in EPIC updated to reflect changes noted below. Source of information: telephone interview with patient, Rx fill history (Surescripts)    Patient's home pharmacy: [facility] ([phone])     Changes made to medication list:   Medications removed: (include reason, ex: therapy completed, inactive medication)   Aspirin - pt states he no longer takes   Duplicate entry for simvastatin   Duplicate entry for Janumet  Medication doses adjusted:    Carvedilol - dose is 12.5 mg BID  Other notes:    Of note, pt does state he takes both Valsartan and Benazepril-HCTZ      Please call with any questions.   Corbin Gibbs, PharmD, [facility]  Wireless # [phone]  12/7/2020 9:18 AM

## 2020-12-08 VITALS
HEART RATE: 92 BPM | OXYGEN SATURATION: 93 % | TEMPERATURE: 97.5 F | DIASTOLIC BLOOD PRESSURE: 74 MMHG | WEIGHT: 205 LBS | SYSTOLIC BLOOD PRESSURE: 107 MMHG | BODY MASS INDEX: 30.36 KG/M2 | RESPIRATION RATE: 20 BRPM | HEIGHT: 69 IN

## 2020-12-08 LAB
ANION GAP SERPL CALCULATED.3IONS-SCNC: 12 MMOL/L (ref 3–16)
BASOPHILS ABSOLUTE: 0 K/UL (ref 0–0.2)
BASOPHILS RELATIVE PERCENT: 0.1 %
BUN BLDV-MCNC: 20 MG/DL (ref 7–20)
CALCIUM SERPL-MCNC: 9.7 MG/DL (ref 8.3–10.6)
CHLORIDE BLD-SCNC: 93 MMOL/L (ref 99–110)
CO2: 30 MMOL/L (ref 21–32)
CREAT SERPL-MCNC: 0.8 MG/DL (ref 0.8–1.3)
EOSINOPHILS ABSOLUTE: 0 K/UL (ref 0–0.6)
EOSINOPHILS RELATIVE PERCENT: 0 %
GFR AFRICAN AMERICAN: >60
GFR NON-AFRICAN AMERICAN: >60
GLUCOSE BLD-MCNC: 157 MG/DL (ref 70–99)
GLUCOSE BLD-MCNC: 224 MG/DL (ref 70–99)
GLUCOSE BLD-MCNC: 329 MG/DL (ref 70–99)
GLUCOSE BLD-MCNC: 347 MG/DL (ref 70–99)
HCT VFR BLD CALC: 42.8 % (ref 40.5–52.5)
HEMOGLOBIN: 14.7 G/DL (ref 13.5–17.5)
LYMPHOCYTES ABSOLUTE: 0.4 K/UL (ref 1–5.1)
LYMPHOCYTES RELATIVE PERCENT: 4.8 %
MCH RBC QN AUTO: 29.1 PG (ref 26–34)
MCHC RBC AUTO-ENTMCNC: 34.5 G/DL (ref 31–36)
MCV RBC AUTO: 84.5 FL (ref 80–100)
MONOCYTES ABSOLUTE: 0.5 K/UL (ref 0–1.3)
MONOCYTES RELATIVE PERCENT: 5.5 %
NEUTROPHILS ABSOLUTE: 7.8 K/UL (ref 1.7–7.7)
NEUTROPHILS RELATIVE PERCENT: 89.6 %
PDW BLD-RTO: 13.1 % (ref 12.4–15.4)
PERFORMED ON: ABNORMAL
PLATELET # BLD: 256 K/UL (ref 135–450)
PMV BLD AUTO: 8.6 FL (ref 5–10.5)
POTASSIUM REFLEX MAGNESIUM: 4 MMOL/L (ref 3.5–5.1)
RBC # BLD: 5.06 M/UL (ref 4.2–5.9)
SARS-COV-2, PCR: DETECTED
SODIUM BLD-SCNC: 135 MMOL/L (ref 136–145)
WBC # BLD: 8.7 K/UL (ref 4–11)

## 2020-12-08 PROCEDURE — 36415 COLL VENOUS BLD VENIPUNCTURE: CPT

## 2020-12-08 PROCEDURE — 6360000002 HC RX W HCPCS

## 2020-12-08 PROCEDURE — 2580000003 HC RX 258

## 2020-12-08 PROCEDURE — 97535 SELF CARE MNGMENT TRAINING: CPT

## 2020-12-08 PROCEDURE — 6360000002 HC RX W HCPCS: Performed by: STUDENT IN AN ORGANIZED HEALTH CARE EDUCATION/TRAINING PROGRAM

## 2020-12-08 PROCEDURE — 94680 O2 UPTK RST&XERS DIR SIMPLE: CPT

## 2020-12-08 PROCEDURE — 85025 COMPLETE CBC W/AUTO DIFF WBC: CPT

## 2020-12-08 PROCEDURE — 80048 BASIC METABOLIC PNL TOTAL CA: CPT

## 2020-12-08 PROCEDURE — 6370000000 HC RX 637 (ALT 250 FOR IP)

## 2020-12-08 PROCEDURE — 6370000000 HC RX 637 (ALT 250 FOR IP): Performed by: INTERNAL MEDICINE

## 2020-12-08 PROCEDURE — 83036 HEMOGLOBIN GLYCOSYLATED A1C: CPT

## 2020-12-08 RX ORDER — GLUCOSAMINE HCL/CHONDROITIN SU 500-400 MG
CAPSULE ORAL
Qty: 100 STRIP | Refills: 1 | Status: SHIPPED | OUTPATIENT
Start: 2020-12-08

## 2020-12-08 RX ORDER — DEXAMETHASONE 6 MG/1
6 TABLET ORAL DAILY
Qty: 8 TABLET | Refills: 0 | OUTPATIENT
Start: 2020-12-08 | End: 2020-12-16

## 2020-12-08 RX ORDER — FUROSEMIDE 40 MG/1
40 TABLET ORAL PRN
Qty: 30 TABLET | Refills: 1 | Status: SHIPPED | OUTPATIENT
Start: 2020-12-08 | End: 2020-12-08 | Stop reason: SDUPTHER

## 2020-12-08 RX ORDER — FAMOTIDINE 20 MG/1
20 TABLET, FILM COATED ORAL 2 TIMES DAILY
Qty: 60 TABLET | Refills: 3 | OUTPATIENT
Start: 2020-12-08

## 2020-12-08 RX ORDER — DEXAMETHASONE 6 MG/1
6 TABLET ORAL DAILY
Qty: 7 TABLET | Refills: 0 | Status: SHIPPED | OUTPATIENT
Start: 2020-12-09 | End: 2020-12-16

## 2020-12-08 RX ORDER — INSULIN LISPRO 100 [IU]/ML
0-12 INJECTION, SOLUTION INTRAVENOUS; SUBCUTANEOUS
Qty: 3 PEN | Refills: 1 | Status: SHIPPED | OUTPATIENT
Start: 2020-12-08 | End: 2020-12-08

## 2020-12-08 RX ORDER — FAMOTIDINE 20 MG/1
20 TABLET, FILM COATED ORAL 2 TIMES DAILY
Qty: 60 TABLET | Refills: 3 | Status: SHIPPED | OUTPATIENT
Start: 2020-12-08

## 2020-12-08 RX ORDER — BLOOD-GLUCOSE METER
1 EACH MISCELLANEOUS 3 TIMES DAILY
Qty: 1 KIT | Refills: 0 | Status: SHIPPED | OUTPATIENT
Start: 2020-12-08

## 2020-12-08 RX ORDER — INSULIN LISPRO 100 [IU]/ML
0-12 INJECTION, SOLUTION INTRAVENOUS; SUBCUTANEOUS
Qty: 1 PEN | Refills: 3 | OUTPATIENT
Start: 2020-12-08

## 2020-12-08 RX ORDER — FUROSEMIDE 40 MG/1
TABLET ORAL
Qty: 30 TABLET | Refills: 1 | Status: SHIPPED | OUTPATIENT
Start: 2020-12-08

## 2020-12-08 RX ORDER — LANCETS 30 GAUGE
1 EACH MISCELLANEOUS 3 TIMES DAILY
Qty: 100 EACH | Refills: 1 | Status: SHIPPED | OUTPATIENT
Start: 2020-12-08

## 2020-12-08 RX ORDER — INSULIN LISPRO 100 [IU]/ML
0-12 INJECTION, SOLUTION INTRAVENOUS; SUBCUTANEOUS
Qty: 3 PEN | Refills: 1 | Status: SHIPPED | OUTPATIENT
Start: 2020-12-08

## 2020-12-08 RX ORDER — GLUCOSAMINE HCL/CHONDROITIN SU 500-400 MG
CAPSULE ORAL
Qty: 100 STRIP | Refills: 1 | Status: SHIPPED | OUTPATIENT
Start: 2020-12-08 | End: 2020-12-08 | Stop reason: SDUPTHER

## 2020-12-08 RX ORDER — GLUCOSAMINE HCL/CHONDROITIN SU 500-400 MG
CAPSULE ORAL
Qty: 100 STRIP | Refills: 0 | OUTPATIENT
Start: 2020-12-08

## 2020-12-08 RX ORDER — PEN NEEDLE, DIABETIC 30 GX5/16"
1 NEEDLE, DISPOSABLE MISCELLANEOUS DAILY
Qty: 100 EACH | Refills: 1 | Status: SHIPPED | OUTPATIENT
Start: 2020-12-08

## 2020-12-08 RX ORDER — LANCETS 30 GAUGE
1 EACH MISCELLANEOUS 3 TIMES DAILY
Qty: 100 EACH | Refills: 0 | OUTPATIENT
Start: 2020-12-08

## 2020-12-08 RX ORDER — BLOOD-GLUCOSE METER
1 KIT MISCELLANEOUS DAILY
Qty: 1 KIT | Refills: 0 | OUTPATIENT
Start: 2020-12-08

## 2020-12-08 RX ADMIN — CARVEDILOL 3.12 MG: 3.12 TABLET, FILM COATED ORAL at 10:22

## 2020-12-08 RX ADMIN — INSULIN LISPRO 2 UNITS: 100 INJECTION, SOLUTION INTRAVENOUS; SUBCUTANEOUS at 09:51

## 2020-12-08 RX ADMIN — Medication 10 ML: at 10:24

## 2020-12-08 RX ADMIN — ENOXAPARIN SODIUM 40 MG: 40 INJECTION SUBCUTANEOUS at 10:23

## 2020-12-08 RX ADMIN — ATORVASTATIN CALCIUM 10 MG: 10 TABLET, FILM COATED ORAL at 10:21

## 2020-12-08 RX ADMIN — INSULIN LISPRO 8 UNITS: 100 INJECTION, SOLUTION INTRAVENOUS; SUBCUTANEOUS at 00:27

## 2020-12-08 RX ADMIN — LISINOPRIL 20 MG: 20 TABLET ORAL at 10:21

## 2020-12-08 RX ADMIN — DEXAMETHASONE 6 MG: 4 TABLET ORAL at 10:53

## 2020-12-08 RX ADMIN — FUROSEMIDE 40 MG: 10 INJECTION, SOLUTION INTRAMUSCULAR; INTRAVENOUS at 10:23

## 2020-12-08 RX ADMIN — INSULIN LISPRO 8 UNITS: 100 INJECTION, SOLUTION INTRAVENOUS; SUBCUTANEOUS at 13:17

## 2020-12-08 NOTE — PROGRESS NOTES
[facility]    Respiratory Therapy     Home Oxygen Evaluation        Name: Ben Fried  Medical Record Number: [id number]  Age: 72 y.o. Gender:  male   : 1955  Today's date: 2020  Room: [address] [place] [address]-      Assessment        /74   Pulse 92   Temp 97.5 °F (36.4 °C) (Oral)   Resp 20   Ht 5' 9\" (1.753 m)   Wt 205 lb (93 kg)   SpO2 93%   BMI 30.27 kg/m²     Patient Active Problem List   Diagnosis    Acute heart failure (HCC)       Social History:  Social History     Tobacco Use    Smoking status: Never Smoker    Smokeless tobacco: Never Used   Substance Use Topics    Alcohol use: Not Currently    Drug use: Not Currently       Patient Room Air saturation at rest 92  %  Patient Room Air saturation upon ambulation 90 % Pt ambulated in room x 6 minute walk test. No QUISPE noted or subjectively felt / pt.       Patient resting on 0  lmp  with an oxygen saturation of  92 %     Patient ambulated on 0 lpm with an oxygen saturation of 90%        In your clinical assessment does the Patient Require Portable Oxygen Tanks?     No:                Patient/caregiver was educated on Home Oxygen process:  Yes      Level of patient/caregiver understanding able to:   [] Verbalize understanding   [] Demonstrate understanding       [] Teach back        [] Needs reinforcement        []  No available caregiver               []  Other:     Response to education:  Very Good     Time Spent with Home O2 Set Up:  20  minutes     Patricia Shanks RCP on 2020 at 2:28 PM                 .

## 2020-12-08 NOTE — PLAN OF CARE
Problem: Falls - Risk of:  Goal: Will remain free from falls  Description: Will remain free from falls  12/7/2020 2012 by Susan Baca RN  Outcome: Ongoing  12/7/2020 1714 by Chan Trejo  Outcome: Ongoing  Goal: Absence of physical injury  Description: Absence of physical injury  Outcome: Ongoing     Problem: Gas Exchange - Impaired:  Goal: Ability to maintain adequate ventilation will improve  Description: Ability to maintain adequate ventilation will improve  12/7/2020 2012 by Susan Baca RN  Outcome: Ongoing  12/7/2020 1714 by Chan Trejo  Outcome: Ongoing  Goal: Levels of oxygenation will improve  Description: Levels of oxygenation will improve  Outcome: Ongoing

## 2020-12-08 NOTE — PROGRESS NOTES
4 Eyes Admission Assessment     I agree as the admission nurse that 2 RN's have performed a thorough Head to Toe Skin Assessment on the patient. ALL assessment sites listed below have been assessed on admission. Areas assessed by both nurses:   [x]   Head, Face, and Ears   [x]   Shoulders, Back, and Chest  [x]   Arms, Elbows, and Hands   [x]   Coccyx, Sacrum, and Ischum  [x]   Legs, Feet, and Heels        Does the Patient have Skin Breakdown?   Yes a wound was noted on the Admission Assessment and an LDA was Initiated documentation include the [place]-wound, Wound Assessment, Measurements, Dressing Treatment, Drainage, and Color\",         Vaughn Prevention initiated:  No   Wound Care Orders initiated:  NA      [facility] nurse consulted for Pressure Injury (Stage 3,4, Unstageable, DTI, NWPT, and Complex wounds):  No      Nurse 1 eSignature: Electronically signed by Claudene Mast on 12/7/20 at 7:16 PM EST    **SHARE this note so that the co-signing nurse is able to place an eSignature**    Nurse 2 eSignature: Electronically signed by Maria Del Rosario Rojas RN on 12/8/20 at 4:42 AM EST

## 2020-12-08 NOTE — PROGRESS NOTES
The patient is still showering and off telemetry. Pt will be placed back on telemetry when he is back to bed.

## 2020-12-08 NOTE — DISCHARGE SUMMARY
INTERNAL MEDICINE DEPARTMENT AT [address] [place] [address]  DISCHARGE SUMMARY    Patient ID: Cassy Cruz                                             Discharge Date: 12/8/2020   Patient's PCP: No primary care provider on file. Discharge Physician: Ángel Redmond MD  Admit Date: 12/6/2020   Admitting Physician: Steve Sawyer DO    PROBLEMS DURING HOSPITALIZATION:  Present on Admission:   Acute heart failure ([facility] [address]      DISCHARGE DIAGNOSES:  Patient Active Problem List   Diagnosis    Acute heart failure [facility])       Hospital Course:   Cassy Cruz presented on 12/6/2020 after experiencing several days of worsening shortness of breath and having tested positive for COVID-19 12/1/2020. 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 He reported he had a history of diuretic use, though he denied CHF diagnosis or ongoing diuretic use at admission. He was treated with decadron and required insulin for steroid-induced hyperglycemia. Diuresis with lasix was also tried. He was discharged with new decadron, anticoagulant, pepcid, and insulin. He was instructed to undergo echocardiogram outpatient. Physical Exam:  /79   Pulse 84   Temp 97.4 °F (36.3 °C) (Oral)   Resp 22   Ht 5' 9\" (1.753 m)   Wt 205 lb (93 kg)   SpO2 90%   BMI 30.27 kg/m²   Constitutional: Awake. Well-developed and well-nourished. No acute distress. HEENT: Normocephalic and atraumatic. No scleral icterus. Cardiovascular: Regular rate. Pulmonary: Normal work of breathing. Crackles in b/l lung bases  Gastrointestinal: Soft. No tenderness. No distention. Musculoskeletal: No peripheral edema. Skin: No cyanosis or pallor. Neurological: Alert and oriented to person, place, time, and situation.      Consults: None  Significant Diagnostic Studies:  CXR  Treatments: Steroid, Anticoagulant  Disposition: home  Discharged Condition:

## 2020-12-08 NOTE — PROGRESS NOTES
Pt is in bed resting. Pt stated he is taking a shower. When I discussed that I need an order he said he was taking a showe. I wrapped the patient's arm, notified the monitor tech, and removed him from telemetry. The pt will be placed back on telemetry once he is showered. Pt is A&Ox4 and is independently ambulatory.

## 2020-12-08 NOTE — PROGRESS NOTES
Pt is back in bed and telemetry monitor is on. Pt is resting comfortably and all vital signs are stable.

## 2020-12-08 NOTE — CARE COORDINATION
Addendum: Pt discharged home via [facility]. Case Management Assessment           Initial Evaluation                Date / Time of Evaluation: 12/8/2020 10:12 AM                 Assessment Completed by: Cr Kitchen    Patient Name: Miky Arita     YOB: 1955  Diagnosis: Acute heart failure, unspecified heart failure type [facility]) [I50.9]  Acute heart failure, unspecified heart failure type [facility]) [I50.9]     Date / Time: 12/6/2020  5:35 PM    Patient Admission Status: Inpatient    If patient is discharged prior to next notation, then this note serves as note for discharge by case management. Current PCP: No primary care provider on file. Clinic Patient: No    Chart Reviewed: Yes  Patient/ Family Interviewed: Yes    Initial assessment completed at bedside with: patient over the phone due to COVID-19. Hospitalization in the last 30 days: No    Emergency Contacts:  Extended Emergency Contact Information  Primary Emergency Contact: tan ritchie  Address: [address] [place] [address] Phone: [phone]  Mobile Phone: [phone]  Relation: Spouse  Preferred language: English   needed? No    Advance Directives:   Code Status: Full Code    Healthcare Power of : No      Financial  Payor: [address] / Plan: BCBS - [place] PPO / Product Type: *No Product type* /     Pre-cert required for SNF: Yes    Pharmacy    [id number] Clerance Kayser, [address] [place] [id number]  Phone: [phone] Fax: [phone]      Potential assistance Purchasing Medications: Potential Assistance Purchasing Medications: No  Does Patient want to participate in local refill/ meds to beds program?: No    Meds To Beds General Rules:  1. Can ONLY be done Monday- Friday between 8:30am-5pm  2. Prescription(s) must be in pharmacy by 3pm to be filled same day  3. Copy of patient's insurance/ prescription drug card and patient face sheet must be sent along with the prescription(s)  4. Cost of Rx cannot be added to hospital bill. If financial assistance is needed, please contact unit  or ;  or  CANNOT provide pharmacy voucher for patients co-pays  5. Patients can then  the prescription on their way out of the hospital at discharge, or pharmacy can deliver to the bedside if staff is available. (payment due at time of pick-up or delivery - cash, check, or card accepted)     Able to afford home medications/ co-pay costs: Yes    ADLS  Support Systems: None    PT AM-PAC:   /24  OT AM-PAC:   /24    [place]: From home with wife      Plans to RETURN to current housing: Yes  Barriers to RETURNING to current housing: medical complications    [address]  Currently ACTIVE with [employer] [address]: No  Home Care Agency: Not Applicable    Currently ACTIVE with [place] on Aging: No      Durable Medical Equipment  DME Provider: n/a  Equipment: none    Home Oxygen and [address] prior to admission: No  [address]: Not Applicable      Informed of need to bring portable home O2 tank on day of DISCHARGE for nursing to connect prior to leaving: No    DISCHARGE PLAN:  Disposition: Home- No Services Needed    Transportation PLAN for discharge: lyft     Factors facilitating achievement of predicted outcomes: Cooperative    Barriers to discharge: Medical complications and Medication managment    Additional Case Management Notes: CM spoke with patient over the phone. Pt lives with his wife. States he will need a Lyft home at d/c. No other CM needs anticipated.      The Plan for Transition of Care is related to the following treatment goals of Acute heart failure, unspecified heart failure type ([address] [I50.9]  Acute heart failure, unspecified heart failure type ([address] [I50.9]    The Patient and/or patient representative Javier Edwards and his family were provided with a choice of provider and agrees with the discharge plan Yes    Freedom of choice list was provided with basic dialogue that supports the patient's individualized plan of care/goals and shares the quality data associated with the providers.  Yes    Care Transition patient: No    Tim Potter RN  [facility]  Case Management Department  Ph: [phone]   Fax: [phone]

## 2020-12-09 ENCOUNTER — CARE COORDINATION (OUTPATIENT)
Dept: CASE MANAGEMENT | Age: 65
End: 2020-12-09

## 2020-12-09 LAB
ESTIMATED AVERAGE GLUCOSE: 165.7 MG/DL
HBA1C MFR BLD: 7.4 %

## 2020-12-09 NOTE — CARE COORDINATION
Date/Time:  12/9/2020 12:50 PM  Attempted to reach patient by telephone. Call within 2 business days of discharge: Yes Left HIPPA compliant message requesting a return call. Will attempt to reach patient again.     Thank Marco A García, RN  Care Transition Coordinator  Contact VTDB:[phone]

## 2020-12-09 NOTE — CARE COORDINATION
before eating or preparing food. If soap and water are not readily available, use an alcohol-based hand  with at least 60% alcohol, covering all surfaces of your hands and rubbing them together until they feel dry. Soap and water are the best option if hands are visibly dirty. Avoid touching your eyes, nose, and mouth with unwashed hands. Avoid sharing personal household items  You should not share dishes, drinking glasses, cups, eating utensils, towels, or bedding with other people or pets in your home. After using these items, they should be washed thoroughly with soap and water. Clean all high-touch surfaces everyday  High touch surfaces include counters, tabletops, doorknobs, bathroom fixtures, toilets, phones, keyboards, tablets, and bedside tables. Also, clean any surfaces that may have blood, stool, or body fluids on them. Use a household cleaning spray or wipe, according to the label instructions. Labels contain instructions for safe and effective use of the cleaning product including precautions you should take when applying the product, such as wearing gloves and making sure you have good ventilation during use of the product. Monitor your symptoms  Seek prompt medical attention if your illness is worsening (e.g., difficulty breathing). Before seeking care, call your healthcare provider and tell them that you have, or are being evaluated for, COVID-19. Put on a facemask before you enter the facility. These steps will help the healthcare providers office to keep other people in the office or waiting room from getting infected or exposed. Ask your healthcare provider to call the local or state health department. Persons who are placed under active monitoring or facilitated self-monitoring should follow instructions provided by their local health department or occupational health professionals, as appropriate. When working with your local health department check their available hours.   If you have a medical emergency and need to call 911, notify the dispatch personnel that you have, or are being evaluated for COVID-19. If possible, put on a facemask before emergency medical services arrive. Discontinuing home isolation  Patients with confirmed COVID-19 should remain under home isolation precautions until the risk of secondary transmission to others is thought to be low. The decision to discontinue home isolation precautions should be made on a case-by-case basis, in consultation with healthcare providers and [facility] and local health departments. [facility] 45 Transitions Initial Follow Up Call    Call within 2 business days of discharge: Yes    Patient: Cyn Rodriguez Patient : 1955   MRN: [id number]   Reason for Admission: COVID-19 Positive  Discharge Date: 20 RARS: Readmission Risk Score: 12      Last Discharge [facility]       Complaint Diagnosis Description Type Department Provider    20 Shortness of Breath Pneumonia due to COVID-19 virus . .. ED to Hosp-Admission (Discharged) (ADMITTED) [facility] U Ermelinda Navarro MD; Jean Marie Acosta. .. Spoke with: [address]: [facility]     Non-face-to-face services provided:  Obtained and reviewed discharge summary and/or continuity of care documents  Education of patient/family/caregiver/guardian to support self-management-.   Assessment and support for treatment adherence and medication management-.    Care Transitions 24 Hour Call    Schedule Follow Up Appointment with PCP:  Declined  Do you have any ongoing symptoms?:  Yes  Patient-reported symptoms:  Cough, Other, Fever, Shortness of Breath (Comment: Low Grade fever- 99.0)  Interventions for patient-reported symptoms:  Notified PCP/Physician  Do you have a copy of your discharge instructions?:  Yes  Do you have all of your prescriptions and are they filled?:  Yes  Have you been contacted by a [facility] Pharmacist?:  No  Have you scheduled your follow up

## 2020-12-16 ENCOUNTER — CARE COORDINATION (OUTPATIENT)
Dept: CASE MANAGEMENT | Age: 65
End: 2020-12-16

## 2020-12-16 NOTE — CARE COORDINATION
Date/Time:  12/16/2020 1:09 PM  Attempted to reach patient by telephone. Call within 2 business days of discharge: Yes Left HIPPA compliant message requesting a return call. Will attempt to reach patient again.     Thank Fritz Van RN  Care Transition Coordinator  Contact MADISONZNER:[phone]

## 2020-12-17 NOTE — PROGRESS NOTES
Physician Progress Note      Nayan De Paz  CSN #:                  [id number]  :                       1955  ADMIT DATE:       2020 5:35 PM  [address] [place] DATE:        2020 2:56 PM  RESPONDING  PROVIDER #:        Erasmo Zimmer MD          QUERY TEXT:    Pt admitted with COVID-19 PNA and has \"acute exacerbation of undiagnosed CHF\"   documented. Known HTN. If possible, please document in progress notes and   discharge summary further specificity regarding the type and acuity of CHF:    The medical record reflects the following:  Risk Factors: HTN  Clinical Indicators: CXR: Mild cardiac enlargement; Home meds: Lotensin,   Diovan, Coreg, Norvasc  Treatment: Lasix, Norvasc, Coreg, Lisinopril  Options provided:  -- The diagnosis of CHF is ruled out  -- Acute on Chronic Systolic CHF/HFrEF  -- Acute on Chronic Diastolic CHF/HFpEF  -- Acute on Chronic Systolic and Diastolic CHF  -- Chronic Systolic CHF/HFrEF, exacerbation ruled out  -- Chronic Diastolic CHF/HFpEF, exacerbation ruled out  -- Chronic Systolic and Diastolic CHF, exacerbation ruled out  -- Other - I will add my own diagnosis  -- Disagree - Not applicable / Not valid  -- Disagree - Clinically unable to determine / Unknown  -- Refer to Clinical Documentation Reviewer    PROVIDER RESPONSE TEXT:    This patient is in acute on chronic diastolic CHF/HFpEF. Query created by:  Tomeka Portillo on 2020 8:23 AM      Electronically signed by:  Erasmo Zimmer MD 2020 8:13 AM

## 2020-12-23 ENCOUNTER — CARE COORDINATION (OUTPATIENT)
Dept: CASE MANAGEMENT | Age: 65
End: 2020-12-23

## 2020-12-23 NOTE — CARE COORDINATION
Date/Time:  12/23/2020 1:36 PM  Attempted to reach patient by telephone. Call within 2 business days of discharge: Yes Left HIPPA compliant message requesting a return call. CTN will close out COVID-19 episode at this time.        Thank Omid Enamorado RN  Care Transition Coordinator  Contact LAET:[phone]